# Patient Record
Sex: MALE | Race: WHITE | NOT HISPANIC OR LATINO | Employment: UNEMPLOYED | URBAN - METROPOLITAN AREA
[De-identification: names, ages, dates, MRNs, and addresses within clinical notes are randomized per-mention and may not be internally consistent; named-entity substitution may affect disease eponyms.]

---

## 2022-04-26 ENCOUNTER — HOSPITAL ENCOUNTER (EMERGENCY)
Facility: MEDICAL CENTER | Age: 22
End: 2022-04-26
Attending: EMERGENCY MEDICINE
Payer: OTHER MISCELLANEOUS

## 2022-04-26 VITALS
RESPIRATION RATE: 18 BRPM | DIASTOLIC BLOOD PRESSURE: 71 MMHG | OXYGEN SATURATION: 94 % | WEIGHT: 174.16 LBS | HEART RATE: 70 BPM | HEIGHT: 75 IN | TEMPERATURE: 97.3 F | BODY MASS INDEX: 21.66 KG/M2 | SYSTOLIC BLOOD PRESSURE: 121 MMHG

## 2022-04-26 DIAGNOSIS — T78.40XA ALLERGIC REACTION, INITIAL ENCOUNTER: ICD-10-CM

## 2022-04-26 PROCEDURE — 96375 TX/PRO/DX INJ NEW DRUG ADDON: CPT

## 2022-04-26 PROCEDURE — 99284 EMERGENCY DEPT VISIT MOD MDM: CPT

## 2022-04-26 PROCEDURE — 96374 THER/PROPH/DIAG INJ IV PUSH: CPT

## 2022-04-26 PROCEDURE — 700111 HCHG RX REV CODE 636 W/ 250 OVERRIDE (IP): Performed by: EMERGENCY MEDICINE

## 2022-04-26 RX ORDER — PREDNISONE 20 MG/1
60 TABLET ORAL DAILY
Qty: 15 TABLET | Refills: 0 | Status: SHIPPED | OUTPATIENT
Start: 2022-04-26 | End: 2022-05-01

## 2022-04-26 RX ORDER — DIPHENHYDRAMINE HCL 25 MG
50 TABLET ORAL EVERY 6 HOURS PRN
COMMUNITY

## 2022-04-26 RX ORDER — EPINEPHRINE 0.3 MG/.3ML
0.3 INJECTION SUBCUTANEOUS ONCE
COMMUNITY

## 2022-04-26 RX ORDER — CHLORAL HYDRATE 500 MG
1000 CAPSULE ORAL DAILY
COMMUNITY

## 2022-04-26 RX ORDER — METHYLPREDNISOLONE SODIUM SUCCINATE 125 MG/2ML
125 INJECTION, POWDER, LYOPHILIZED, FOR SOLUTION INTRAMUSCULAR; INTRAVENOUS ONCE
Status: COMPLETED | OUTPATIENT
Start: 2022-04-26 | End: 2022-04-26

## 2022-04-26 RX ORDER — FAMOTIDINE 20 MG/1
20 TABLET, FILM COATED ORAL 2 TIMES DAILY
Qty: 60 TABLET | Refills: 0 | Status: SHIPPED | OUTPATIENT
Start: 2022-04-26

## 2022-04-26 RX ADMIN — FAMOTIDINE 20 MG: 10 INJECTION INTRAVENOUS at 15:43

## 2022-04-26 RX ADMIN — METHYLPREDNISOLONE SODIUM SUCCINATE 125 MG: 125 INJECTION, POWDER, FOR SOLUTION INTRAMUSCULAR; INTRAVENOUS at 15:43

## 2022-04-26 NOTE — ED PROVIDER NOTES
"ED Provider Note    CHIEF COMPLAINT  Chief Complaint   Patient presents with   • Allergic Reaction     To peanuts-approx 1 hr pta-took 50mg benadryl po       HPI  Colton Jeffery is a 21 y.o. male here for evaluation of an allergic reaction.  The pt states that he had a possible exposure to a peanut butter cookie, and started to develop some hives to his upper arms and some cough.  Patient took 50 mg of Benadryl about an hour prior to coming, and this did help his symptoms, however he went to be checked to make sure he did not \"get any worse.\"  He has no vomiting, no fever chills.  He states he is able to tolerate secretions without difficulty, but he states when he swallows it does scratch his throat which is why he does not feel like doing so.  He has no chest pain, shortness breath, or any other medical concerns at this time.  He is here with his mom.  He has had multiple episodes of panic reactions and allergies, but does not currently have his EpiPen on him.    ROS  See HPI for further details, o/w negative.     PAST MEDICAL HISTORY   Peanut allergy  Allergic reaction    SOCIAL HISTORY  Social History     Tobacco Use   • Smoking status: Not on file   • Smokeless tobacco: Not on file   Substance and Sexual Activity   • Alcohol use: Not on file   • Drug use: Not on file   • Sexual activity: Not on file       Family History  No bleeding disorders    SURGICAL HISTORY  patient denies any surgical history    CURRENT MEDICATIONS  Home Medications     Reviewed by Jay Hummel (Pharmacy Tech) on 04/26/22 at 1605  Med List Status: Complete   Medication Last Dose Status   Ascorbic Acid (VITAMIN C PO) 4/26/2022 Active   diphenhydrAMINE (BENADRYL) 25 MG Tab 4/26/2022 Active   EPINEPHrine (EPIPEN 2-SARAHY) 0.3 MG/0.3ML Solution Auto-injector solution for injection >10 days Active   Multiple Vitamins-Minerals (ZINC PO) 4/26/2022 Active   Omega-3 Fatty Acids (FISH OIL) 1000 MG Cap capsule 4/26/2022 Active          "       ALLERGIES  Allergies   Allergen Reactions   • Peanut-Derived Anaphylaxis       REVIEW OF SYSTEMS  See HPI for further details. Review of systems as above, otherwise all other systems are negative.     PHYSICAL EXAM  Constitutional: Well developed, well nourished. No acute distress.  HEENT: Normocephalic, atraumatic. Posterior pharynx clear and moist.  Eyes:  EOMI. Normal sclera.  Neck: Supple, Full range of motion, nontender.,  No stridor  Chest; equal expansion, no respiratory distress.  Musculoskeletal: No deformity, no edema, neurovascular intact.   Neuro: Clear speech, appropriate, cooperative, cranial nerves II-XII grossly intact.  Psych: Normal mood and affect  Skin; urticaria noted to upper extremities.  Blanching.  No vesicles.    PROCEDURES     MEDICAL RECORD  I have reviewed patient's medical record and pertinent results are listed.    COURSE & MEDICAL DECISION MAKING  I have reviewed any medical record information, laboratory studies and radiographic results as noted above.    The patient was given Solu-Medrol here and Pepcid.  He took Benadryl prior to coming in.  He has been observed for a while and feels much better.  He has no stridor, respiratory distress, or wheezing.  His pulse ox is 94% on room air.  He is comfortable going home.  He will be with his mom.  I have written him for an additional few days of steroids, and an EpiPen for any emergencies.    If you have had any blood pressure issues while here in the emergency department, please see your doctor for a further evaluation or work up.    Differential diagnoses include but not limited to: Allergic reaction, anaphylaxis,    Diagnosis;  Drug reaction

## 2022-04-26 NOTE — ED NOTES
Med rec updated and complete, per pt  Allergies reviewed, per pt   Interviewed pt with mother at bedside with permission from pt.

## 2022-04-26 NOTE — ED NOTES
Iv placed , Pt medicated as directed by ER md, poc update given to pt. Further orders and dispo pending at this time. No further questions from pt at this time

## 2022-04-26 NOTE — ED NOTES
Pt to er with c/o peanut ingestion approx 1 hr pta with po benadryl approx 30 minutes after. Pt with upper airway wheezing , also having difficulty swallowing secretions

## 2022-04-27 NOTE — ED NOTES
D/c pt home, with mother . 3 rx given . Pt aware of f/u instructions , aware to return for any changes or concerns. No further questions upon d/c home from ed

## 2023-12-11 ENCOUNTER — HOSPITAL ENCOUNTER (OUTPATIENT)
Dept: RADIOLOGY | Facility: MEDICAL CENTER | Age: 23
End: 2023-12-11
Attending: CHIROPRACTOR

## 2023-12-11 ENCOUNTER — HOSPITAL ENCOUNTER (OUTPATIENT)
Dept: RADIOLOGY | Facility: MEDICAL CENTER | Age: 23
End: 2023-12-11
Attending: CHIROPRACTOR
Payer: OTHER MISCELLANEOUS

## 2023-12-11 DIAGNOSIS — M54.6 PAIN IN THORACIC SPINE: ICD-10-CM

## 2023-12-11 DIAGNOSIS — M50.80 CALCIFICATION OF INTERVERTEBRAL CARTILAGE OR DISC OF CERVICAL REGION: ICD-10-CM

## 2023-12-11 DIAGNOSIS — M54.50 LOW BACK PAIN, UNSPECIFIED BACK PAIN LATERALITY, UNSPECIFIED CHRONICITY, UNSPECIFIED WHETHER SCIATICA PRESENT: ICD-10-CM

## 2023-12-11 DIAGNOSIS — M53.82 MUSCULOSKELETAL DISORDER OF THE SUBOCCIPITAL: ICD-10-CM

## 2023-12-11 PROCEDURE — 72050 X-RAY EXAM NECK SPINE 4/5VWS: CPT

## 2023-12-11 PROCEDURE — 72100 X-RAY EXAM L-S SPINE 2/3 VWS: CPT

## 2023-12-11 PROCEDURE — 72070 X-RAY EXAM THORAC SPINE 2VWS: CPT
